# Patient Record
Sex: FEMALE | Race: WHITE | NOT HISPANIC OR LATINO | URBAN - METROPOLITAN AREA
[De-identification: names, ages, dates, MRNs, and addresses within clinical notes are randomized per-mention and may not be internally consistent; named-entity substitution may affect disease eponyms.]

---

## 2017-10-06 ENCOUNTER — INPATIENT (INPATIENT)
Facility: HOSPITAL | Age: 67
LOS: 0 days | Discharge: HOME | DRG: 313 | End: 2017-10-06
Attending: INTERNAL MEDICINE | Admitting: INTERNAL MEDICINE
Payer: COMMERCIAL

## 2017-10-06 ENCOUNTER — TRANSCRIPTION ENCOUNTER (OUTPATIENT)
Age: 67
End: 2017-10-06

## 2017-10-06 VITALS
OXYGEN SATURATION: 99 % | RESPIRATION RATE: 16 BRPM | DIASTOLIC BLOOD PRESSURE: 86 MMHG | HEART RATE: 83 BPM | SYSTOLIC BLOOD PRESSURE: 168 MMHG

## 2017-10-06 VITALS — WEIGHT: 181.44 LBS

## 2017-10-06 DIAGNOSIS — R79.89 OTHER SPECIFIED ABNORMAL FINDINGS OF BLOOD CHEMISTRY: ICD-10-CM

## 2017-10-06 DIAGNOSIS — M79.605 PAIN IN LEFT LEG: ICD-10-CM

## 2017-10-06 DIAGNOSIS — E78.4 OTHER HYPERLIPIDEMIA: ICD-10-CM

## 2017-10-06 DIAGNOSIS — R07.9 CHEST PAIN, UNSPECIFIED: ICD-10-CM

## 2017-10-06 DIAGNOSIS — F32.9 MAJOR DEPRESSIVE DISORDER, SINGLE EPISODE, UNSPECIFIED: ICD-10-CM

## 2017-10-06 DIAGNOSIS — I10 ESSENTIAL (PRIMARY) HYPERTENSION: ICD-10-CM

## 2017-10-06 DIAGNOSIS — Z29.9 ENCOUNTER FOR PROPHYLACTIC MEASURES, UNSPECIFIED: ICD-10-CM

## 2017-10-06 LAB
ALBUMIN SERPL ELPH-MCNC: 4.2 G/DL — SIGNIFICANT CHANGE UP (ref 3.3–5)
ALP SERPL-CCNC: 63 U/L — SIGNIFICANT CHANGE UP (ref 40–120)
ALT FLD-CCNC: 20 U/L RC — SIGNIFICANT CHANGE UP (ref 10–45)
ANION GAP SERPL CALC-SCNC: 15 MMOL/L — SIGNIFICANT CHANGE UP (ref 5–17)
AST SERPL-CCNC: 21 U/L — SIGNIFICANT CHANGE UP (ref 10–40)
BASOPHILS # BLD AUTO: 0.1 K/UL — SIGNIFICANT CHANGE UP (ref 0–0.2)
BASOPHILS NFR BLD AUTO: 0.8 % — SIGNIFICANT CHANGE UP (ref 0–2)
BILIRUB SERPL-MCNC: 0.2 MG/DL — SIGNIFICANT CHANGE UP (ref 0.2–1.2)
BUN SERPL-MCNC: 35 MG/DL — HIGH (ref 7–23)
CALCIUM SERPL-MCNC: 9.9 MG/DL — SIGNIFICANT CHANGE UP (ref 8.4–10.5)
CHLORIDE SERPL-SCNC: 103 MMOL/L — SIGNIFICANT CHANGE UP (ref 96–108)
CK MB BLD-MCNC: 1.1 % — SIGNIFICANT CHANGE UP (ref 0–3.5)
CK MB CFR SERPL CALC: 1.2 NG/ML — SIGNIFICANT CHANGE UP (ref 0–3.8)
CK MB CFR SERPL CALC: 1.2 NG/ML — SIGNIFICANT CHANGE UP (ref 0–3.8)
CK SERPL-CCNC: 114 U/L — SIGNIFICANT CHANGE UP (ref 25–170)
CK SERPL-CCNC: 82 U/L — SIGNIFICANT CHANGE UP (ref 25–170)
CO2 SERPL-SCNC: 24 MMOL/L — SIGNIFICANT CHANGE UP (ref 22–31)
CREAT SERPL-MCNC: 1.19 MG/DL — SIGNIFICANT CHANGE UP (ref 0.5–1.3)
EOSINOPHIL # BLD AUTO: 0.2 K/UL — SIGNIFICANT CHANGE UP (ref 0–0.5)
EOSINOPHIL NFR BLD AUTO: 2.1 % — SIGNIFICANT CHANGE UP (ref 0–6)
GLUCOSE SERPL-MCNC: 124 MG/DL — HIGH (ref 70–99)
HCT VFR BLD CALC: 36.4 % — SIGNIFICANT CHANGE UP (ref 34.5–45)
HGB BLD-MCNC: 11.8 G/DL — SIGNIFICANT CHANGE UP (ref 11.5–15.5)
LYMPHOCYTES # BLD AUTO: 2.1 K/UL — SIGNIFICANT CHANGE UP (ref 1–3.3)
LYMPHOCYTES # BLD AUTO: 22.5 % — SIGNIFICANT CHANGE UP (ref 13–44)
MAGNESIUM SERPL-MCNC: 1.9 MG/DL — SIGNIFICANT CHANGE UP (ref 1.6–2.6)
MCHC RBC-ENTMCNC: 28.5 PG — SIGNIFICANT CHANGE UP (ref 27–34)
MCHC RBC-ENTMCNC: 32.4 GM/DL — SIGNIFICANT CHANGE UP (ref 32–36)
MCV RBC AUTO: 87.9 FL — SIGNIFICANT CHANGE UP (ref 80–100)
MONOCYTES # BLD AUTO: 0.6 K/UL — SIGNIFICANT CHANGE UP (ref 0–0.9)
MONOCYTES NFR BLD AUTO: 6.8 % — SIGNIFICANT CHANGE UP (ref 2–14)
NEUTROPHILS # BLD AUTO: 6.4 K/UL — SIGNIFICANT CHANGE UP (ref 1.8–7.4)
NEUTROPHILS NFR BLD AUTO: 67.8 % — SIGNIFICANT CHANGE UP (ref 43–77)
PHOSPHATE SERPL-MCNC: 3.1 MG/DL — SIGNIFICANT CHANGE UP (ref 2.5–4.5)
PLATELET # BLD AUTO: 327 K/UL — SIGNIFICANT CHANGE UP (ref 150–400)
POTASSIUM SERPL-MCNC: 4.7 MMOL/L — SIGNIFICANT CHANGE UP (ref 3.5–5.3)
POTASSIUM SERPL-SCNC: 4.7 MMOL/L — SIGNIFICANT CHANGE UP (ref 3.5–5.3)
PROT SERPL-MCNC: 8.3 G/DL — SIGNIFICANT CHANGE UP (ref 6–8.3)
RBC # BLD: 4.14 M/UL — SIGNIFICANT CHANGE UP (ref 3.8–5.2)
RBC # FLD: 14 % — SIGNIFICANT CHANGE UP (ref 10.3–14.5)
SODIUM SERPL-SCNC: 142 MMOL/L — SIGNIFICANT CHANGE UP (ref 135–145)
TROPONIN T SERPL-MCNC: <0.01 NG/ML — SIGNIFICANT CHANGE UP (ref 0–0.06)
TROPONIN T SERPL-MCNC: <0.01 NG/ML — SIGNIFICANT CHANGE UP (ref 0–0.06)
WBC # BLD: 9.4 K/UL — SIGNIFICANT CHANGE UP (ref 3.8–10.5)
WBC # FLD AUTO: 9.4 K/UL — SIGNIFICANT CHANGE UP (ref 3.8–10.5)

## 2017-10-06 PROCEDURE — 93018 CV STRESS TEST I&R ONLY: CPT

## 2017-10-06 PROCEDURE — 99285 EMERGENCY DEPT VISIT HI MDM: CPT | Mod: 25,GC

## 2017-10-06 PROCEDURE — 99223 1ST HOSP IP/OBS HIGH 75: CPT

## 2017-10-06 PROCEDURE — 71020: CPT | Mod: 26

## 2017-10-06 PROCEDURE — 93016 CV STRESS TEST SUPVJ ONLY: CPT

## 2017-10-06 PROCEDURE — 93010 ELECTROCARDIOGRAM REPORT: CPT

## 2017-10-06 PROCEDURE — 78452 HT MUSCLE IMAGE SPECT MULT: CPT | Mod: 26

## 2017-10-06 RX ORDER — CHOLECALCIFEROL (VITAMIN D3) 125 MCG
1 CAPSULE ORAL
Qty: 0 | Refills: 0 | COMMUNITY

## 2017-10-06 RX ORDER — ASPIRIN/CALCIUM CARB/MAGNESIUM 324 MG
1 TABLET ORAL
Qty: 0 | Refills: 0 | COMMUNITY
Start: 2017-10-06

## 2017-10-06 RX ORDER — ASPIRIN/CALCIUM CARB/MAGNESIUM 324 MG
81 TABLET ORAL DAILY
Qty: 0 | Refills: 0 | Status: DISCONTINUED | OUTPATIENT
Start: 2017-10-06 | End: 2017-10-06

## 2017-10-06 RX ORDER — HYDROCHLOROTHIAZIDE 25 MG
12.5 TABLET ORAL DAILY
Qty: 0 | Refills: 0 | Status: DISCONTINUED | OUTPATIENT
Start: 2017-10-06 | End: 2017-10-06

## 2017-10-06 RX ORDER — ATORVASTATIN CALCIUM 80 MG/1
1 TABLET, FILM COATED ORAL
Qty: 0 | Refills: 0 | COMMUNITY

## 2017-10-06 RX ORDER — ACETAMINOPHEN 500 MG
650 TABLET ORAL EVERY 6 HOURS
Qty: 0 | Refills: 0 | Status: DISCONTINUED | OUTPATIENT
Start: 2017-10-06 | End: 2017-10-06

## 2017-10-06 RX ORDER — HYDROCHLOROTHIAZIDE 25 MG
12.5 TABLET ORAL ONCE
Qty: 0 | Refills: 0 | Status: COMPLETED | OUTPATIENT
Start: 2017-10-06 | End: 2017-10-06

## 2017-10-06 RX ORDER — ASPIRIN/CALCIUM CARB/MAGNESIUM 324 MG
1 TABLET ORAL
Qty: 0 | Refills: 0 | COMMUNITY

## 2017-10-06 RX ORDER — SODIUM CHLORIDE 9 MG/ML
500 INJECTION INTRAMUSCULAR; INTRAVENOUS; SUBCUTANEOUS ONCE
Qty: 0 | Refills: 0 | Status: COMPLETED | OUTPATIENT
Start: 2017-10-06 | End: 2017-10-06

## 2017-10-06 RX ORDER — ONDANSETRON 8 MG/1
4 TABLET, FILM COATED ORAL ONCE
Qty: 0 | Refills: 0 | Status: COMPLETED | OUTPATIENT
Start: 2017-10-06 | End: 2017-10-06

## 2017-10-06 RX ORDER — CITALOPRAM 10 MG/1
10 TABLET, FILM COATED ORAL DAILY
Qty: 0 | Refills: 0 | Status: DISCONTINUED | OUTPATIENT
Start: 2017-10-06 | End: 2017-10-06

## 2017-10-06 RX ORDER — CITALOPRAM 10 MG/1
20 TABLET, FILM COATED ORAL DAILY
Qty: 0 | Refills: 0 | Status: DISCONTINUED | OUTPATIENT
Start: 2017-10-06 | End: 2017-10-06

## 2017-10-06 RX ORDER — ROSUVASTATIN CALCIUM 5 MG/1
0 TABLET ORAL
Qty: 0 | Refills: 0 | COMMUNITY

## 2017-10-06 RX ORDER — ATORVASTATIN CALCIUM 80 MG/1
1 TABLET, FILM COATED ORAL
Qty: 0 | Refills: 0 | COMMUNITY
Start: 2017-10-06

## 2017-10-06 RX ORDER — METOCLOPRAMIDE HCL 10 MG
10 TABLET ORAL ONCE
Qty: 0 | Refills: 0 | Status: COMPLETED | OUTPATIENT
Start: 2017-10-06 | End: 2017-10-06

## 2017-10-06 RX ORDER — ATORVASTATIN CALCIUM 80 MG/1
20 TABLET, FILM COATED ORAL AT BEDTIME
Qty: 0 | Refills: 0 | Status: DISCONTINUED | OUTPATIENT
Start: 2017-10-06 | End: 2017-10-06

## 2017-10-06 RX ORDER — VALSARTAN 80 MG/1
320 TABLET ORAL ONCE
Qty: 0 | Refills: 0 | Status: COMPLETED | OUTPATIENT
Start: 2017-10-06 | End: 2017-10-06

## 2017-10-06 RX ORDER — CITALOPRAM 10 MG/1
1 TABLET, FILM COATED ORAL
Qty: 0 | Refills: 0 | COMMUNITY
Start: 2017-10-06

## 2017-10-06 RX ORDER — CITALOPRAM 10 MG/1
1 TABLET, FILM COATED ORAL
Qty: 0 | Refills: 0 | COMMUNITY

## 2017-10-06 RX ORDER — HYDROCHLOROTHIAZIDE 25 MG
12.5 TABLET ORAL ONCE
Qty: 0 | Refills: 0 | Status: DISCONTINUED | OUTPATIENT
Start: 2017-10-06 | End: 2017-10-06

## 2017-10-06 RX ORDER — ONDANSETRON 8 MG/1
4 TABLET, FILM COATED ORAL EVERY 6 HOURS
Qty: 0 | Refills: 0 | Status: DISCONTINUED | OUTPATIENT
Start: 2017-10-06 | End: 2017-10-06

## 2017-10-06 RX ORDER — MILK THISTLE 150 MG
1 CAPSULE ORAL
Qty: 0 | Refills: 0 | COMMUNITY

## 2017-10-06 RX ORDER — ENOXAPARIN SODIUM 100 MG/ML
40 INJECTION SUBCUTANEOUS EVERY 24 HOURS
Qty: 0 | Refills: 0 | Status: DISCONTINUED | OUTPATIENT
Start: 2017-10-06 | End: 2017-10-06

## 2017-10-06 RX ORDER — VALSARTAN 80 MG/1
320 TABLET ORAL DAILY
Qty: 0 | Refills: 0 | Status: DISCONTINUED | OUTPATIENT
Start: 2017-10-06 | End: 2017-10-06

## 2017-10-06 RX ADMIN — Medication 12.5 MILLIGRAM(S): at 17:26

## 2017-10-06 RX ADMIN — ONDANSETRON 4 MILLIGRAM(S): 8 TABLET, FILM COATED ORAL at 04:08

## 2017-10-06 RX ADMIN — ENOXAPARIN SODIUM 40 MILLIGRAM(S): 100 INJECTION SUBCUTANEOUS at 12:46

## 2017-10-06 RX ADMIN — Medication 81 MILLIGRAM(S): at 12:47

## 2017-10-06 RX ADMIN — VALSARTAN 320 MILLIGRAM(S): 80 TABLET ORAL at 06:30

## 2017-10-06 RX ADMIN — Medication 10 MILLIGRAM(S): at 06:01

## 2017-10-06 RX ADMIN — CITALOPRAM 20 MILLIGRAM(S): 10 TABLET, FILM COATED ORAL at 12:47

## 2017-10-06 RX ADMIN — SODIUM CHLORIDE 1000 MILLILITER(S): 9 INJECTION INTRAMUSCULAR; INTRAVENOUS; SUBCUTANEOUS at 06:01

## 2017-10-06 RX ADMIN — Medication 12.5 MILLIGRAM(S): at 06:30

## 2017-10-06 RX ADMIN — SODIUM CHLORIDE 500 MILLILITER(S): 9 INJECTION INTRAMUSCULAR; INTRAVENOUS; SUBCUTANEOUS at 03:28

## 2017-10-06 NOTE — ED ADULT NURSE NOTE - OBJECTIVE STATEMENT
68y/o female BIBA a&ox3 c/o chest discomfort. Patient reports she was having some knee pain tonight around 1230 and she got up to take some medicine. States when she got back into bed she had sudden onset chest tightness and nausea. Also c/o dizziness and generalized weakness. As per EMS, patient vomited x2 prior to arrival. EMS reports administering 162mg ASA and 8mg Zofran prior to arrival. Patient state she already took 81mg ASA at home. Patient currently c/o mild dizziness and some chest discomfort, states her symptoms have improved since she got to ED. Denies current N/V, HA, vision changes, SOB, abd pain, fever, back pain. Lungs clear b/l. Abd soft, nontender, nondistended. Hypertensive in ED. EKG done. Patient resting in bed on CM awaiting MD rivero. Safety and comfort maintained.

## 2017-10-06 NOTE — H&P ADULT - HISTORY OF PRESENT ILLNESS
67F h/o HTN, HLD, anxiety, p/w chest pain. Chest pain started last night, was substernal, non-radiating, constant, described as heaviness, somewhat exertional, associated with some diaphoresis, no alleviating factors. Pt had similar episodes in the past, was told it was stress related. Last stress test a few years ago was negative as per patient. Pt also reports nausea and non-bloody vomiting last night, as well as a few episodes of diarrhea. 67F h/o HTN, HLD, depression, p/w chest pain. Chest pain started last night, was substernal, non-radiating, constant, described as heaviness, somewhat exertional, associated with some diaphoresis, no alleviating factors. Pt had similar episodes in the past, was told it was stress related. Last stress test a few years ago was negative as per patient. Pt also reports nausea and non-bloody vomiting last night, as well as a few episodes of diarrhea.

## 2017-10-06 NOTE — ED PROVIDER NOTE - ATTENDING CONTRIBUTION TO CARE
Attending MD Leonardo.  Agree with above.  Pt is a 67 yr old female with hx of HLD, HTN presenting to ED with complaint tightness in her chest, nausea and dry heaving non-bloody emesis.  Pain is non-radiating, substernal.  No fevers/chills.  Pain is 'somewhat positional'.  Pt received 2 rounds 4 mg zofran by EMS and baby ASA.  Cards is Dr. Mark Arevalo.  Strong family hx of cardiac disease.  Plan to obtain labs/trops/admit for provocative cardiac testing with stress test.  Pt is a non-smoker.

## 2017-10-06 NOTE — ED ADULT NURSE REASSESSMENT NOTE - NS ED NURSE REASSESS COMMENT FT1
patient c/o sudden increase in nausea. States she "spit up" a few times. MD Leonardo aware. Medicated as per orders.

## 2017-10-06 NOTE — H&P ADULT - PROBLEM SELECTOR PLAN 5
-intermittent, associated with LE edema   -check LE doppler to r/o DVT -patient on a statin at home, not sure of name, thinks it might be Crestor. Need to clarify.   -check FLP

## 2017-10-06 NOTE — ED ADULT NURSE REASSESSMENT NOTE - NS ED NURSE REASSESS COMMENT FT1
Per patient she takes valsartan 320mg and hctz 12.5mg daily for blood pressure.  Medications ordered as per patient's request because she takes BP meds in the morning.  ED MD Jenny alvarez.

## 2017-10-06 NOTE — DISCHARGE NOTE ADULT - CARE PROVIDER_API CALL
Bentley Cagle (MD), Cardiovascular Disease; Internal Medicine  [address] [address]  Phone: [phone]  Fax: [phone]

## 2017-10-06 NOTE — PROGRESS NOTE ADULT - ASSESSMENT
Pt with midsternal CP after episode of N/V, EKG AND enzymes neg. Doubt ischemia but given sx and family will need to r/o ischemia. Doubt sx represent PE and except for swelling in left knee which is most likely related to arthritis changes, no edema of LE therefore doubt DVT and will hold on venous dopplers. GI sx may have been related to NSAIDs, WILL follow for now and can give some IVF

## 2017-10-06 NOTE — H&P ADULT - PROBLEM SELECTOR PLAN 2
-elevated BP  -continue Valsartan/HCTZ   -monitor BP -intermittent, associated with LE edema   -check LE doppler to r/o DVT

## 2017-10-06 NOTE — DISCHARGE NOTE ADULT - PATIENT PORTAL LINK FT
“You can access the [facility] Patient Portal, offered by [facility], by registering with the following website: [email or web address]”

## 2017-10-06 NOTE — PROGRESS NOTE ADULT - SUBJECTIVE AND OBJECTIVE BOX
Patient is a 67y old  Female who presents with a chief complaint of chest pain (06 Oct 2017 08:35)  67y    HPI:  67F h/o HTN, HLD, depression, p/w chest pain. Pt was stable until last night when she developed pain in her left knee. Pt took ibuprofen and than topical pain reliever and than developed nausea/vomiting and diarrhea. Subsequent to that pt developed midsternal  Chest pain which was  non-radiating, constant, described as heaviness,  associated with some diaphoresis and sob. Symptoms lasted 2 hrs and no alleviating factors and pt came to ER for evaluation. Pt had similar episodes in the past, was told it was stress related. Last stress test several  years ago was negative as per patient. Pt also reports nausea and non-bloody vomiting last night, as well as a few episodes of diarrhea. (06 Oct 2017 08:35)            MEDICATIONS  (STANDING):  aspirin enteric coated 81 milliGRAM(s) Oral daily  citalopram 10 milliGRAM(s) Oral daily  enoxaparin Injectable 40 milliGRAM(s) SubCutaneous every 24 hours  hydrochlorothiazide 12.5 milliGRAM(s) Oral daily  valsartan 320 milliGRAM(s) Oral daily  atorvastatin 20mg daily    MEDICATIONS  (PRN):  acetaminophen   Tablet. 650 milliGRAM(s) Oral every 6 hours PRN Mild Pain (1 - 3)  ondansetron Injectable 4 milliGRAM(s) IV Push every 6 hours PRN Nausea      Allergies    No Known Allergies    FAMILY HISTORY:  Family history of early CAD (Father)        Social HX   occassional ETOH      REVIEW OF SYSTEMS:  CONSTITUTIONAL: No fever, weight loss, or fatigue  EYES: No eye pain, visual disturbances, or discharge  ENMT:  No difficulty hearing, tinnitus, vertigo; No sinus or throat pain  NECK: No pain or stiffness  BREASTS: No pain, masses, or nipple discharge  RESPIRATORY: No cough, wheezing, chills or hemoptysis; No shortness of breath(now)  CARDIOVASCULAR: No chest pain (NOW), palpitations, dizziness, or leg swelling  GASTROINTESTINAL: No abdominal or epigastric pain. No nausea or  vomiting (now),  No diarrhea (now) or constipation. No melena or hematochezia.  GENITOURINARY: No dysuria, frequency, hematuria, or incontinence  NEUROLOGICAL: No headaches, memory loss, loss of strength, numbness, or tremors  SKIN: No itching, burning, rashes, or lesions   LYMPH NODES: No enlarged glands  ENDOCRINE: No heat or cold intolerance; No hair loss  MUSCULOSKELETAL: LEFT knee  pain and mild  swelling; No muscle, back, or extremity pain  PSYCHIATRIC: No depression, + anxiety  HEME/LYMPH: No easy bruising, or bleeding gums  ALLERY AND IMMUNOLOGIC: No hives or eczema        Vital Signs Last 24 Hrs  T(C): 36.5 (06 Oct 2017 08:01), Max: 36.7 (06 Oct 2017 02:59)  T(F): 97.7 (06 Oct 2017 08:01), Max: 98.1 (06 Oct 2017 02:59)  HR: 83 (06 Oct 2017 08:01) (83 - 88)  BP: 172/73 (06 Oct 2017 08:01) (161/70 - 184/70)  BP(mean): --  RR: 18 (06 Oct 2017 08:01) (16 - 18)  SpO2: 98% (06 Oct 2017 08:01) (97% - 100%)      PHYSICAL EXAM:  GENERAL: NAD, well-groomed, well-developed  HEAD:  Atraumatic, Normocephalic  EYES: conjunctiva and sclera clear  ENMT: No tonsillar erythema, exudates, or enlargement; Moist mucous membranes, Good dentition, No lesions  NECK: Supple, No JVD, Normal thyroid  NERVOUS SYSTEM:  Alert & Oriented X3, Good concentration; Motor Strength 5/5 B/L upper and lower extremities  CHEST/LUNG: Clear to  auscultation bilaterally; No rales, rhonchi, wheezing, or rubs  HEART: Regular rate and rhythm; No murmurs, rubs, or gallops  ABDOMEN: Soft, Nontender, Nondistended; Bowel sounds present, neg HSM  EXTREMITIES: No clubbing, cyanosis, or edema  LYMPH: No lymphadenopathy noted  SKIN: No rashes or lesions  MS:mild swelling left knee, warm, no erythema        CBC Full  -  ( 06 Oct 2017 03:28 )  WBC Count : 9.4 K/uL  Hemoglobin : 11.8 g/dL  Hematocrit : 36.4 %  Platelet Count - Automated : 327 K/uL  Mean Cell Volume : 87.9 fl  Mean Cell Hemoglobin : 28.5 pg  Mean Cell Hemoglobin Concentration : 32.4 gm/dL  Auto Neutrophil # : 6.4 K/uL  Auto Lymphocyte # : 2.1 K/uL  Auto Monocyte # : 0.6 K/uL  Auto Eosinophil # : 0.2 K/uL  Auto Basophil # : 0.1 K/uL  Auto Neutrophil % : 67.8 %  Auto Lymphocyte % : 22.5 %  Auto Monocyte % : 6.8 %  Auto Eosinophil % : 2.1 %  Auto Basophil % : 0.8 %    10-06    142  |  103  |  35<H>  ----------------------------<  124<H>  4.7   |  24  |  1.19    Ca    9.9      06 Oct 2017 03:28  Phos  3.1     10-06  Mg     1.9     10-06    TPro  8.3  /  Alb  4.2  /  TBili  0.2  /  DBili  x   /  AST  21  /  ALT  20  /  AlkPhos  63  10-06

## 2017-10-06 NOTE — ED PROVIDER NOTE - OBJECTIVE STATEMENT
68 yo F w/ pmhx of HTN, HLD c/o tightness in the chest, nausea, vomit NB but bilious which happened around 12:00am. Chest pain is substernal, non-radiating, constant, somewhat exertional, some diaphoresis. Denies any numbness and tingling, fever, productive cough, dysuria, diarrhea, constipation. Denies any recent travel. Pt has some chronic leg swelling, but felt a bit tight tonight, maybe more swollen than usual. Pt is not smoker. (+) phx of MI. Pt's cardiologist is Dr. Mark Arevalo. Pt was brought in by ambulance, received zofran and 2 baby ASA. 68 yo F w/ pmhx of HTN, HLD c/o tightness in the chest, nausea, vomit NB but bilious which happened around 12:00am. Chest pain is substernal, non-radiating, constant, somewhat exertional, some diaphoresis. Denies any numbness and tingling, fever, productive cough, dysuria, diarrhea, constipation. Denies any recent travel. Pt has some chronic leg swelling, but felt a bit tight tonight, maybe more swollen than usual. Pt is not smoker. (+) phx of MI. Pt's cardiologist is Dr. Mark Arevalo. Pt was brought in by ambulance, received Zofran and 2 baby ASA.

## 2017-10-06 NOTE — DISCHARGE NOTE ADULT - MEDICATION SUMMARY - MEDICATIONS TO TAKE
I will START or STAY ON the medications listed below when I get home from the hospital:    Turmeric  -- 1 cap(s) by mouth once a day  -- Indication: For herbal     aspirin 81 mg oral delayed release tablet  -- 1 tab(s) by mouth once a day  -- Indication: For CAD    citalopram 20 mg oral tablet  -- 1 tab(s) by mouth once a day  -- Indication: For Anti-depressant    atorvastatin 20 mg oral tablet  -- 1 tab(s) by mouth once a day (at bedtime)  -- Indication: For High Cholesterol    valsartan-hydrochlorothiazide 320mg-12.5mg oral tablet  -- 1 tab(s) by mouth once a day  -- Indication: For High Blood Pressure    multivitamin  -- 1 tab(s) by mouth once a day  -- Indication: For Vitamin    Vitamin D3  -- 1 cap(s) by mouth once a day  -- Indication: For Vitamin

## 2017-10-06 NOTE — H&P ADULT - PROBLEM SELECTOR PLAN 1
-EKG normal  -cardiac enzymes negative x 1  -admit to telemetry  -trend cardiac enzymes  -stress test if enzymes negative  -will notify Cardiologist, Dr Arevalo -EKG normal  -cardiac enzymes negative x 1  -admit to telemetry  -trend cardiac enzymes  -spoke with Cardiologist, Dr Arevalo, will obtain stress test if enzymes negative

## 2017-10-06 NOTE — H&P ADULT - NSHPLABSRESULTS_GEN_ALL_CORE
11.8   9.4   )-----------( 327      ( 06 Oct 2017 03:28 )             36.4   10-06    142  |  103  |  35<H>  ----------------------------<  124<H>  4.7   |  24  |  1.19    Ca    9.9      06 Oct 2017 03:28  Phos  3.1     10-06  Mg     1.9     10-06    TPro  8.3  /  Alb  4.2  /  TBili  0.2  /  DBili  x   /  AST  21  /  ALT  20  /  AlkPhos  63  10-06    CARDIAC MARKERS ( 06 Oct 2017 03:28 )  x     / <0.01 ng/mL / 114 U/L / x     / 1.2 ng/mL    CXR (prelim): clear lungs    EKG tracing reviewed and interpreted: NSR

## 2017-10-06 NOTE — DISCHARGE NOTE ADULT - PLAN OF CARE
Patient remains hemodynamically stable. HOME CARE INSTRUCTIONS  For the next few days, avoid physical activities that bring on chest pain. Continue physical activities as directed.  Do not smoke.  Avoid drinking alcohol.   Only take over-the-counter or prescription medicine for pain, discomfort, or fever as directed by your caregiver.  Follow your caregiver's suggestions for further testing if your chest pain does not go away.  Keep any follow-up appointments you made. If you do not go to an appointment, you could develop lasting (chronic) problems with pain. If there is any problem keeping an appointment, you must call to reschedule.   SEEK MEDICAL CARE IF:  You think you are having problems from the medicine you are taking. Read your medicine instructions carefully.  Your chest pain does not go away, even after treatment.  You develop a rash with blisters on your chest.  SEEK IMMEDIATE MEDICAL CARE IF:  You have increased chest pain or pain that spreads to your arm, neck, jaw, back, or abdomen.   You develop shortness of breath, an increasing cough, or you are coughing up blood.  You have severe back or abdominal pain, feel nauseous, or vomit.  You develop severe weakness, fainting, or chills.  You have a fever.  THIS IS AN EMERGENCY. Do not wait to see if the pain will go away. Get medical help at once. Call your local emergency services . Do not drive yourself to the hospital. Low salt diet  Activity as tolerated.  Take all medication as prescribed.  Follow up with your medical doctor for routine blood pressure monitoring at your next visit.  Notify your doctor if you have any of the following symptoms:   Dizziness, Lightheadedness, Blurry vision, Headache, Chest pain, Shortness of breath Continue with current medication.

## 2017-10-06 NOTE — H&P ADULT - PROBLEM SELECTOR PLAN 3
-patient on a statin at home, not sure of name, thinks it might be Crestor. Need to clarify.   -check FLP -due to nausea/vomiting and diarrhea, now resolved   -Zofran prn   -s/p IVF   -may continue ARB/HCTZ  -monitor BUN/Cr

## 2017-10-06 NOTE — DISCHARGE NOTE ADULT - HOSPITAL COURSE
Pt with midsternal CP after episode of N/V, EKG AND enzymes neg. Doubt ischemia but given sx and family will need to r/o ischemia. Doubt sx represent PE and except for swelling in left knee which is most likely related to arthritis changes, no edema of LE therefore doubt DVT and will hold on venous dopplers. GI sx may have been related to NSAIDs, WILL follow for now and can give some IVF     Problem/Plan - 1:  ·  Problem: Chest pain, unspecified type.     cardiology eval     Stress test negative.      Problem/Plan - 2:  ·  Problem: Essential hypertension.    Continue current medications. Follow up with Dr. Cagle next week for BP eval    Pt stable discharged home with outpatient follow up with Dr. Cagle. Pt with midsternal CP after episode of N/V, EKG AND enzymes neg. Doubt ischemia but given sx and family will need to r/o ischemia. Doubt sx represent PE and except for swelling in left knee which is most likely related to arthritis changes, no edema of LE therefore doubt DVT and will hold on venous dopplers. GI sx may have been related to NSAIDs. Pt;s cardiac enzymes were neg and pt underwent ETT which was neg as well. Pt  was  stable and was discharged home with outpatient follow up in my office in 1 week.

## 2017-10-06 NOTE — CONSULT NOTE ADULT - SUBJECTIVE AND OBJECTIVE BOX
Chief Complaint:     HPI:    PMH:   Pneumonia  Depression  Osteopenia  Hypertension  Hyperlipidemia    PSH:   S/P knee replacement partial on left  S/P Arthroscopic Knee Surgery  S/P  Section    Family History:  FAMILY HISTORY:  Family history of early CAD (Father)    Allergies:  No Known Allergies    Social History:  Smoking:  Alcohol:  Drugs:    Medications:  acetaminophen   Tablet. 650 milliGRAM(s) Oral every 6 hours PRN  aspirin enteric coated 81 milliGRAM(s) Oral daily  atorvastatin 20 milliGRAM(s) Oral at bedtime  citalopram 10 milliGRAM(s) Oral daily  enoxaparin Injectable 40 milliGRAM(s) SubCutaneous every 24 hours  hydrochlorothiazide 12.5 milliGRAM(s) Oral daily  ondansetron Injectable 4 milliGRAM(s) IV Push every 6 hours PRN  valsartan 320 milliGRAM(s) Oral daily      Cardiovascular Diagnostic Testing:  ECG:    Echo:    Stress Testing:    Cath:    Imaging:    Labs:                        11.8   9.4   )-----------( 327      ( 06 Oct 2017 03:28 )             36.4     10-06    142  |  103  |  35<H>  ----------------------------<  124<H>  4.7   |  24  |  1.19    Ca    9.9      06 Oct 2017 03:28  Phos  3.1     10  Mg     1.9     10-06    TPro  8.3  /  Alb  4.2  /  TBili  0.2  /  DBili  x   /  AST  21  /  ALT  20  /  AlkPhos  63  10-      CARDIAC MARKERS ( 06 Oct 2017 03:28 )  x     / <0.01 ng/mL / 114 U/L / x     / 1.2 ng/mL                Physical Exam:  T(C): 36.5 (10-06-17 @ 08:01), Max: 36.7 (10-06-17 @ 02:59)  HR: 83 (10-06-17 @ 08:01) (83 - 88)  BP: 172/73 (10-06-17 @ 08:01) (161/70 - 184/70)  RR: 18 (10-06-17 @ 08:01) (16 - 18)  SpO2: 98% (10-06-17 @ 08:01) (97% - 100%)  Wt(kg): --    Daily     Daily Weight in k.3 (06 Oct 2017 07:54) Chief Complaint: Chest pain    HPI: 67 F presents with chest pain. She noted not feeling well last evening including left leg pain which she gets related to prior knee surgery. Should took NSAID. Shortly after she felt chest pain. Associated with nausea. Also with cold sweat and SOB. No radiation. Resolved on its own. Pain is described as a tightness of moderate severity. She has HTN on medical therapy. HLD on statin therapy. She notes a strong family history of CAD with both parents having heart attacks and a sister who has had CABG.      PMH:   Pneumonia  Depression  Osteopenia  Hypertension  Hyperlipidemia    PSH:   S/P knee replacement partial on left  S/P Arthroscopic Knee Surgery  S/P  Section    Family History:  FAMILY HISTORY:  Family history of early CAD (Father)    Allergies:  No Known Allergies    Social History:  Smoking:  Alcohol:  Drugs:    Medications:  acetaminophen   Tablet. 650 milliGRAM(s) Oral every 6 hours PRN  aspirin enteric coated 81 milliGRAM(s) Oral daily  atorvastatin 20 milliGRAM(s) Oral at bedtime  citalopram 10 milliGRAM(s) Oral daily  enoxaparin Injectable 40 milliGRAM(s) SubCutaneous every 24 hours  hydrochlorothiazide 12.5 milliGRAM(s) Oral daily  ondansetron Injectable 4 milliGRAM(s) IV Push every 6 hours PRN  valsartan 320 milliGRAM(s) Oral daily      Cardiovascular Diagnostic Testing:  ECG: NSR. Nl axis, nl intervals. No acute St-T changes.     Echo:    Stress Testing:    Cath:    Imaging:    Labs:                        11.8   9.4   )-----------( 327      ( 06 Oct 2017 03:28 )             36.4     10-    142  |  103  |  35<H>  ----------------------------<  124<H>  4.7   |  24  |  1.19    Ca    9.9      06 Oct 2017 03:28  Phos  3.1     10-  Mg     1.9     10-    TPro  8.3  /  Alb  4.2  /  TBili  0.2  /  DBili  x   /  AST  21  /  ALT  20  /  AlkPhos  63  10-      CARDIAC MARKERS ( 06 Oct 2017 03:28 )  x     / <0.01 ng/mL / 114 U/L / x     / 1.2 ng/mL    Physical Exam:  T(C): 36.5 (10-06-17 @ 08:01), Max: 36.7 (10-06-17 @ 02:59)  HR: 83 (10-06-17 @ 08:01) (83 - 88)  BP: 172/73 (10-06-17 @ 08:01) (161/70 - 184/70)  RR: 18 (10-06-17 @ 08:01) (16 - 18)  SpO2: 98% (10-06-17 @ 08:01) (97% - 100%)  Wt(kg): --    Daily     Daily Weight in k.3 (06 Oct 2017 07:54)

## 2017-10-06 NOTE — CONSULT NOTE ADULT - ASSESSMENT
67 F with chest pain, HTN, HLD and strong family history  ·	Given symptoms and multiple risk factors patient will need stress test.   ·	BP is elevated even after BP medications. Increase HCTZ to 25 mg qd. Continue valsartan. Patient may require additional agent.

## 2017-10-06 NOTE — H&P ADULT - NSHPPHYSICALEXAM_GEN_ALL_CORE
Vital Signs Last 24 Hrs  T(C): 36.5 (06 Oct 2017 08:01), Max: 36.7 (06 Oct 2017 02:59)  T(F): 97.7 (06 Oct 2017 08:01), Max: 98.1 (06 Oct 2017 02:59)  HR: 83 (06 Oct 2017 08:01) (83 - 88)  BP: 172/73 (06 Oct 2017 08:01) (161/70 - 184/70)  BP(mean): --  RR: 18 (06 Oct 2017 08:01) (16 - 18)  SpO2: 98% (06 Oct 2017 08:01) (97% - 100%)

## 2017-12-19 PROCEDURE — 82553 CREATINE MB FRACTION: CPT

## 2017-12-19 PROCEDURE — 96374 THER/PROPH/DIAG INJ IV PUSH: CPT

## 2017-12-19 PROCEDURE — 78452 HT MUSCLE IMAGE SPECT MULT: CPT

## 2017-12-19 PROCEDURE — 80053 COMPREHEN METABOLIC PANEL: CPT

## 2017-12-19 PROCEDURE — 83735 ASSAY OF MAGNESIUM: CPT

## 2017-12-19 PROCEDURE — 93005 ELECTROCARDIOGRAM TRACING: CPT

## 2017-12-19 PROCEDURE — 84100 ASSAY OF PHOSPHORUS: CPT

## 2017-12-19 PROCEDURE — A9500: CPT

## 2017-12-19 PROCEDURE — 85027 COMPLETE CBC AUTOMATED: CPT

## 2017-12-19 PROCEDURE — 99285 EMERGENCY DEPT VISIT HI MDM: CPT | Mod: 25

## 2017-12-19 PROCEDURE — 71046 X-RAY EXAM CHEST 2 VIEWS: CPT

## 2017-12-19 PROCEDURE — 82550 ASSAY OF CK (CPK): CPT

## 2017-12-19 PROCEDURE — 84484 ASSAY OF TROPONIN QUANT: CPT

## 2017-12-19 PROCEDURE — 93017 CV STRESS TEST TRACING ONLY: CPT

## 2019-09-16 ENCOUNTER — EMERGENCY (EMERGENCY)
Facility: HOSPITAL | Age: 69
LOS: 1 days | Discharge: HOME | End: 2019-09-16
Attending: EMERGENCY MEDICINE
Payer: MEDICARE

## 2019-09-16 VITALS
OXYGEN SATURATION: 100 % | HEART RATE: 82 BPM | RESPIRATION RATE: 16 BRPM | DIASTOLIC BLOOD PRESSURE: 84 MMHG | SYSTOLIC BLOOD PRESSURE: 187 MMHG

## 2019-09-16 VITALS
WEIGHT: 175.05 LBS | OXYGEN SATURATION: 99 % | DIASTOLIC BLOOD PRESSURE: 79 MMHG | RESPIRATION RATE: 18 BRPM | HEART RATE: 92 BPM | SYSTOLIC BLOOD PRESSURE: 183 MMHG | TEMPERATURE: 98 F | HEIGHT: 61 IN

## 2019-09-16 LAB
ALBUMIN SERPL ELPH-MCNC: 4.6 G/DL — SIGNIFICANT CHANGE UP (ref 3.3–5)
ALP SERPL-CCNC: 61 U/L — SIGNIFICANT CHANGE UP (ref 40–120)
ALT FLD-CCNC: 17 U/L — SIGNIFICANT CHANGE UP (ref 10–45)
ANION GAP SERPL CALC-SCNC: 14 MMOL/L — SIGNIFICANT CHANGE UP (ref 5–17)
AST SERPL-CCNC: 13 U/L — SIGNIFICANT CHANGE UP (ref 10–40)
BASOPHILS # BLD AUTO: 0 K/UL — SIGNIFICANT CHANGE UP (ref 0–0.2)
BASOPHILS NFR BLD AUTO: 0.2 % — SIGNIFICANT CHANGE UP (ref 0–2)
BILIRUB SERPL-MCNC: 0.4 MG/DL — SIGNIFICANT CHANGE UP (ref 0.2–1.2)
BUN SERPL-MCNC: 16 MG/DL — SIGNIFICANT CHANGE UP (ref 7–23)
CALCIUM SERPL-MCNC: 10.3 MG/DL — SIGNIFICANT CHANGE UP (ref 8.4–10.5)
CHLORIDE SERPL-SCNC: 102 MMOL/L — SIGNIFICANT CHANGE UP (ref 96–108)
CO2 SERPL-SCNC: 23 MMOL/L — SIGNIFICANT CHANGE UP (ref 22–31)
CREAT SERPL-MCNC: 0.73 MG/DL — SIGNIFICANT CHANGE UP (ref 0.5–1.3)
EOSINOPHIL # BLD AUTO: 0.1 K/UL — SIGNIFICANT CHANGE UP (ref 0–0.5)
EOSINOPHIL NFR BLD AUTO: 0.6 % — SIGNIFICANT CHANGE UP (ref 0–6)
GLUCOSE SERPL-MCNC: 119 MG/DL — HIGH (ref 70–99)
HCT VFR BLD CALC: 38.4 % — SIGNIFICANT CHANGE UP (ref 34.5–45)
HGB BLD-MCNC: 12.6 G/DL — SIGNIFICANT CHANGE UP (ref 11.5–15.5)
LYMPHOCYTES # BLD AUTO: 1.8 K/UL — SIGNIFICANT CHANGE UP (ref 1–3.3)
LYMPHOCYTES # BLD AUTO: 17.1 % — SIGNIFICANT CHANGE UP (ref 13–44)
MCHC RBC-ENTMCNC: 28.9 PG — SIGNIFICANT CHANGE UP (ref 27–34)
MCHC RBC-ENTMCNC: 32.8 GM/DL — SIGNIFICANT CHANGE UP (ref 32–36)
MCV RBC AUTO: 88.1 FL — SIGNIFICANT CHANGE UP (ref 80–100)
MONOCYTES # BLD AUTO: 0.6 K/UL — SIGNIFICANT CHANGE UP (ref 0–0.9)
MONOCYTES NFR BLD AUTO: 6.1 % — SIGNIFICANT CHANGE UP (ref 2–14)
NEUTROPHILS # BLD AUTO: 8.1 K/UL — HIGH (ref 1.8–7.4)
NEUTROPHILS NFR BLD AUTO: 76.1 % — SIGNIFICANT CHANGE UP (ref 43–77)
PLATELET # BLD AUTO: 308 K/UL — SIGNIFICANT CHANGE UP (ref 150–400)
POTASSIUM SERPL-MCNC: 4.2 MMOL/L — SIGNIFICANT CHANGE UP (ref 3.5–5.3)
POTASSIUM SERPL-SCNC: 4.2 MMOL/L — SIGNIFICANT CHANGE UP (ref 3.5–5.3)
PROT SERPL-MCNC: 8.4 G/DL — HIGH (ref 6–8.3)
RBC # BLD: 4.36 M/UL — SIGNIFICANT CHANGE UP (ref 3.8–5.2)
RBC # FLD: 13.2 % — SIGNIFICANT CHANGE UP (ref 10.3–14.5)
SODIUM SERPL-SCNC: 139 MMOL/L — SIGNIFICANT CHANGE UP (ref 135–145)
TROPONIN T, HIGH SENSITIVITY RESULT: 7 NG/L — SIGNIFICANT CHANGE UP (ref 0–51)
TROPONIN T, HIGH SENSITIVITY RESULT: <6 NG/L — SIGNIFICANT CHANGE UP (ref 0–51)
WBC # BLD: 10.6 K/UL — HIGH (ref 3.8–10.5)
WBC # FLD AUTO: 10.6 K/UL — HIGH (ref 3.8–10.5)

## 2019-09-16 PROCEDURE — 99284 EMERGENCY DEPT VISIT MOD MDM: CPT | Mod: GC

## 2019-09-16 PROCEDURE — 84484 ASSAY OF TROPONIN QUANT: CPT

## 2019-09-16 PROCEDURE — 80053 COMPREHEN METABOLIC PANEL: CPT

## 2019-09-16 PROCEDURE — 71046 X-RAY EXAM CHEST 2 VIEWS: CPT | Mod: 26

## 2019-09-16 PROCEDURE — 93005 ELECTROCARDIOGRAM TRACING: CPT

## 2019-09-16 PROCEDURE — 85027 COMPLETE CBC AUTOMATED: CPT

## 2019-09-16 PROCEDURE — 71046 X-RAY EXAM CHEST 2 VIEWS: CPT

## 2019-09-16 PROCEDURE — 99283 EMERGENCY DEPT VISIT LOW MDM: CPT | Mod: 25

## 2019-09-16 NOTE — ED ADULT NURSE NOTE - OBJECTIVE STATEMENT
69 y f came to the ed c/o chest tightness and cold sweats. says the symptoms started today around 730 while getting dressed. said she sat down and relaxed. said symptoms went away after an hour. does not think the resting was effective at getting rid of the symptoms as they lasted for about one hour after she sat down. denies any fever, chills, sob. skin is warm and dry.

## 2019-09-16 NOTE — ED PROVIDER NOTE - CLINICAL SUMMARY MEDICAL DECISION MAKING FREE TEXT BOX
69y female with nausea, chills, and chest tightness. Concern for acs, will get labs, ekg, chest xray.

## 2019-09-16 NOTE — ED PROVIDER NOTE - OBJECTIVE STATEMENT
69y female with PMH of HTN and anxiety presenting with nausea, chills and chest tightness. Started this morning has multiple episodes of chills and nausea, which were relieved after a soft BM. She then developed mild chest tightness which lasted a few minutes, no chest pain, no associated SOB. No diaphoresis, dizziness, abdominal pain, vomiting. Has a family history of heart disease. 69y female with PMH of HTN and anxiety presenting with nausea, chills and chest tightness. Started this morning has multiple episodes of chills and nausea, which were relieved after a soft BM. She then developed mild chest tightness which lasted a few minutes, no chest pain, no associated SOB. No diaphoresis, dizziness, abdominal pain, vomiting. Has a family history of heart disease.    Attendinyo female presents with a tightness in the chest this AM when she was having coffee.  symptoms began around 8am and was the worst around 815.  resolved around 10am.  no nausea or vomiting.  no diaphoresis.  felt a chill.  no shortness of breath.

## 2019-09-16 NOTE — ED ADULT NURSE NOTE - NSIMPLEMENTINTERV_GEN_ALL_ED
Implemented All Universal Safety Interventions:  [place] to call system. Call bell, personal items and telephone within reach. Instruct patient to call for assistance. Room bathroom lighting operational. Non-slip footwear when patient is off stretcher. Physically safe environment: no spills, clutter or unnecessary equipment. Stretcher in lowest position, wheels locked, appropriate side rails in place.

## 2019-09-16 NOTE — ED PROVIDER NOTE - NS ED ROS FT
Gen:+chills. No fever  Eyes: No vision changes, eye irritation or discharge  ENT: No congestion, sore throat  Resp: No cough or SOB  Cardiovascular: + chest tightness. No chest pain or palpitations  GI: +nausea. No abdominal pain, vomiting, diarrhea, constipation  :  No change in urine output or frequency; no dysuria  MS: No joint or muscle pain  Skin: No rashes  Neuro: No headache; No numbness or weakness  Remainder negative, except as per the HPI

## 2019-09-16 NOTE — ED PROVIDER NOTE - NSFOLLOWUPINSTRUCTIONS_ED_ALL_ED_FT
Please follow up with your cardiologist this week.    Return to the emergency department if you have new or worsening symptoms such as: chest pain, shortness of breath or nausea and vomiting.    Continue taking all of you medications as prescribed.

## 2019-09-16 NOTE — ED ADULT TRIAGE NOTE - CHIEF COMPLAINT QUOTE
chills, nausea, chest tightness started this morning, Denies chest pain, denies SOB, denies palpitations, denies pain.  Report she did not take her BP med this morning.

## 2020-03-03 NOTE — DISCHARGE NOTE ADULT - NS AS DC AMI YN
----- Message from Jayde Poole [phone]  Additional Information:     
Spoke with pt about test results. She verbalized understanding.   
no

## 2021-03-17 NOTE — ED PROVIDER NOTE - PHYSICAL EXAMINATION
Detail Level: Detailed Depth Of Biopsy: dermis Was A Bandage Applied: Yes Size Of Lesion In Cm: 0 Biopsy Type: H and E Gen: AAOx3, non-toxic  Head: NCAT  HEENT: EOMI, oral mucosa moist, normal conjunctiva  Lung: CTAB, no respiratory distress, no wheezes/rhonchi/rales B/L, speaking in full sentences  CV: RRR, no murmurs, rubs or gallops  Abd: soft, NTND, no guarding, no CVA tenderness  MSK: no visible deformities  Neuro: No focal sensory or motor deficits  Skin: Warm, well perfused, no rash  Psych: normal affect.   ~Jeancarlos Terri PGY2 Biopsy Method: Dermablade Anesthesia Type: 1% lidocaine with epinephrine Anesthesia Volume In Cc: 0.5 Hemostasis: Drysol Wound Care: Petrolatum Dressing: bandage Destruction After The Procedure: No Type Of Destruction Used: Curettage Curettage Text: The wound bed was treated with curettage after the biopsy was performed. Cryotherapy Text: The wound bed was treated with cryotherapy after the biopsy was performed. Electrodesiccation Text: The wound bed was treated with electrodesiccation after the biopsy was performed. Electrodesiccation And Curettage Text: The wound bed was treated with electrodesiccation and curettage after the biopsy was performed. Silver Nitrate Text: The wound bed was treated with silver nitrate after the biopsy was performed. Lab: [id number] Lab Facility: [id number] Consent: Written consent was obtained and risks were reviewed including but not limited to scarring, infection, bleeding, scabbing, incomplete removal, nerve damage and allergy to anesthesia. Post-Care Instructions: I reviewed with the patient in detail post-care instructions. Patient is to keep the biopsy site dry overnight, and then apply bacitracin twice daily until healed. Patient may apply hydrogen peroxide soaks to remove any crusting. Notification Instructions: Patient will be notified of biopsy results. However, patient instructed to call the office if not contacted within 2 weeks. Billing Type: Third-Party Bill Information: Selecting Yes will display possible errors in your note based on the variables you have selected. This validation is only offered as a suggestion for you. PLEASE NOTE THAT THE VALIDATION TEXT WILL BE REMOVED WHEN YOU FINALIZE YOUR NOTE. IF YOU WANT TO FAX A PRELIMINARY NOTE YOU WILL NEED TO TOGGLE THIS TO 'NO' IF YOU DO NOT WANT IT IN YOUR FAXED NOTE.

## 2022-09-15 ENCOUNTER — EMERGENCY (EMERGENCY)
Facility: HOSPITAL | Age: 72
LOS: 1 days | Discharge: HOME | End: 2022-09-15
Attending: EMERGENCY MEDICINE
Payer: MEDICARE

## 2022-09-15 VITALS
HEART RATE: 97 BPM | SYSTOLIC BLOOD PRESSURE: 184 MMHG | OXYGEN SATURATION: 100 % | WEIGHT: 171.96 LBS | HEIGHT: 61 IN | DIASTOLIC BLOOD PRESSURE: 79 MMHG | RESPIRATION RATE: 16 BRPM | TEMPERATURE: 97 F

## 2022-09-15 PROCEDURE — 70450 CT HEAD/BRAIN W/O DYE: CPT | Mod: ME

## 2022-09-15 PROCEDURE — 90471 IMMUNIZATION ADMIN: CPT

## 2022-09-15 PROCEDURE — 99284 EMERGENCY DEPT VISIT MOD MDM: CPT | Mod: 25

## 2022-09-15 PROCEDURE — 99284 EMERGENCY DEPT VISIT MOD MDM: CPT | Mod: FS

## 2022-09-15 PROCEDURE — 90715 TDAP VACCINE 7 YRS/> IM: CPT

## 2022-09-15 PROCEDURE — G1004: CPT

## 2022-09-15 RX ORDER — TETANUS TOXOID, REDUCED DIPHTHERIA TOXOID AND ACELLULAR PERTUSSIS VACCINE, ADSORBED 5; 2.5; 8; 8; 2.5 [IU]/.5ML; [IU]/.5ML; UG/.5ML; UG/.5ML; UG/.5ML
0.5 SUSPENSION INTRAMUSCULAR ONCE
Refills: 0 | Status: COMPLETED | OUTPATIENT
Start: 2022-09-15 | End: 2022-09-15

## 2022-09-15 RX ADMIN — TETANUS TOXOID, REDUCED DIPHTHERIA TOXOID AND ACELLULAR PERTUSSIS VACCINE, ADSORBED 0.5 MILLILITER(S): 5; 2.5; 8; 8; 2.5 SUSPENSION INTRAMUSCULAR at 23:38

## 2022-09-15 NOTE — ED ADULT NURSE NOTE - OBJECTIVE STATEMENT
Pt is a 72y F PMH PNA, depression, OP, HTN, HLD p/w R frontal laceration and ecchymosis s/p mechanical trip and fall. A&Ox4, PAINTER, lungs clear, distal pulses intact, abdomen soft, skin intact. Side rails up for safety, call bell and personal items within reach, instructed to call for assistance, verbalizes understanding. Will continue to monitor. Pt is a 72y F PMH PNA, depression, OP, HTN, HLD p/w R frontal laceration and ecchymosis s/p mechanical trip and fall. Pt was taking out garbage and tripped on unlevel ground, fell, and struck R forehead on concrete. Denies LOC, AC use. A&Ox4, neuro exam intact, PAINTER, lungs clear, distal pulses intact, abdomen soft, skin intact. Side rails up for safety, call bell and personal items within reach, instructed to call for assistance, verbalizes understanding. Will continue to monitor.

## 2022-09-15 NOTE — ED PROVIDER NOTE - PATIENT PORTAL LINK FT
You can access the FollowMyHealth Patient Portal offered by [facility] by registering at the following website: [email or web address]. By joining [employer]’s FollowMyHealth portal, you will also be able to view your health information using other applications (apps) compatible with our system.

## 2022-09-15 NOTE — ED PROVIDER NOTE - PHYSICAL EXAMINATION
Attn - alert, nad, head - NC, hematoma R forehead with superficial laceration/abrasion 1cm, no facial tenderness, mandible and TMJ are NT, tongue - no trauma, PERRL 3 mm, nose - NT, no deformity or signs of epistaxis, neck/spine/back - NT, Chest/ribs - NT, Lungs - clear, good BS bilaterally without splinting, Cor - RR, no M, Abdo - soft, NT, ND, no HSM or tenderness, no ecchymosis or signs of trauma, no CVAT, Pelvis/hips - NT, and no pain with AROM.  UExt - FROM without pain, NT, LExt - FROM without pain, NT,  Neuro - CN, motor, sensory, cerebellar, speech intact and non focal

## 2022-09-15 NOTE — ED PROVIDER NOTE - CLINICAL SUMMARY MEDICAL DECISION MAKING FREE TEXT BOX
Attn - trip and fall with head injury and hematoma R forehead with superficial abrasion/laceration that does not require closure.  tdap.  declined analgesia.

## 2022-09-15 NOTE — ED PROVIDER NOTE - NSICDXPASTMEDICALHX_GEN_ALL_CORE_FT
PAST MEDICAL HISTORY:  Depression     Hyperlipidemia     Hypertension     Osteopenia     Pneumonia

## 2022-09-15 NOTE — ED PROVIDER NOTE - NSFOLLOWUPINSTRUCTIONS_ED_ALL_ED_FT
Please follow up with your primary care doctor within 1 week.  *Bring all printed lab/test results to your appointment(s).*    Take acetaminophen 500-1000mg every 6 hrs as needed for pain. DO NOT EXCEED 4000mg DAILY.    Apply topical antibiotic ointment to scalp abrasion 2-3x daily    Return to the ED for worsening pain, nausea, vomiting, dizziness, or any other concerns. Please follow up with your primary care doctor within 1 week.  *Bring all printed lab/test results to your appointment(s).*    Take acetaminophen 500-1000mg every 6 hrs as needed for pain. DO NOT EXCEED 4000mg DAILY.    Apply topical antibiotic ointment to scalp abrasion 2-3x daily    Apply ice to area 20 minutes on area every 2-4 hours for the next 48-72 hours.    Return to the ED for worsening pain, nausea, vomiting, dizziness, or any other concerns. You were seen here after a head injury. The CT scan does not show any signs of internal injury at this time. Please follow up with your primary care doctor within 1 week.*Bring all printed lab/test results to your appointment(s).*    Take acetaminophen 500-1000mg every 6 hrs as needed for pain. DO NOT EXCEED 4000mg DAILY.    Apply topical antibiotic ointment to scalp abrasion 2-3x daily    Apply ice to area 20 minutes on area every 2-4 hours for the next 48-72 hours.    Return to the ED for worsening pain, nausea, vomiting, dizziness, or any other concerns.

## 2022-09-15 NOTE — ED PROVIDER NOTE - NS ED ATTENDING STATEMENT MOD
This was a shared visit with the PAPITO. I reviewed and verified the documentation and independently performed the documented:

## 2022-09-15 NOTE — ED PROVIDER NOTE - CARE PLAN
Principal Discharge DX:	Head injury  Secondary Diagnosis:	Traumatic hematoma of forehead  Secondary Diagnosis:	Laceration of forehead   1

## 2022-09-15 NOTE — ED PROVIDER NOTE - PROGRESS NOTE DETAILS
Raya Barbosa, Attending Physician: Patient signed out to me pending CTH. Results reviewed with patient. anticipatory guidance and supportive care reviewed. Return precautions including but not limited to those listed on discharge instructions were discussed at length and patient felt comfortable going home. All questions answered prior to discharge.

## 2022-09-15 NOTE — ED PROVIDER NOTE - OBJECTIVE STATEMENT
Attending note.  Patient was seen in room #34 left.  Patient tripped when emptying the garbage this evening causing her to fall and striking her head on concrete pavement.  Patient sustained wound to right forehead which began to swell.  Patient reports also mild headache with some nausea.  She had no loss of consciousness, vomiting.  She reports some mild neck stiffness in the shoulders bilaterally.  She also sustained some bruising and abrasion to the palms of both hands.  Patient recently had right hip replacement 3 months ago.  Reports some stiffness but no groin or hip pain.  She denies any chest/rib pain or abdominal pain.  She has no numbness or paresthesia.  She takes no anticoagulation or antiplatelets.

## 2022-09-15 NOTE — ED PROVIDER NOTE - NSICDXPASTSURGICALHX_GEN_ALL_CORE_FT
PAST SURGICAL HISTORY:  S/P Arthroscopic Knee Surgery     S/P  Section     S/P knee replacement partial on left

## 2022-09-16 VITALS
SYSTOLIC BLOOD PRESSURE: 162 MMHG | OXYGEN SATURATION: 99 % | RESPIRATION RATE: 17 BRPM | DIASTOLIC BLOOD PRESSURE: 81 MMHG | HEART RATE: 82 BPM

## 2022-09-16 PROCEDURE — 70450 CT HEAD/BRAIN W/O DYE: CPT | Mod: 26,ME

## 2022-09-16 PROCEDURE — G1004: CPT

## 2022-09-16 NOTE — ED ADULT NURSE REASSESSMENT NOTE - NS ED NURSE REASSESS COMMENT FT1
Report received from CHARLETTE Lira. Pt resting comfortably in stretcher, pending imaging results. Safety and comfort measures maintained.

## 2022-10-31 NOTE — H&P ADULT - PROBLEM SELECTOR PROBLEM 5
[FreeTextEntry1] : This report was generated using voice recognition software. Please excuse obvious typographical errors and contact this office for any questions. Any preliminary copy of this note given to the patient at the time of this visit has not been proofread or edited. This note is part of a shared electronic record used by our providers and may contain information generated by others in addition to those entries made during today's visit.\par  Pain of left lower extremity Other hyperlipidemia

## 2023-02-07 NOTE — ED PROVIDER NOTE - CROS ED CONS ALL NEG
Physical Therapy    Visit Type: treatment  Co-treat with: Occupational therapist  Precautions:  Medical precautions:  fall risk; standard precautions.    PPE worn during session: droplet mask and gloves    Patient admitted for ETOH withdrawl.    Pertinent medical history significant for, but not limited to: ETOH abuse, alcoholic liver disease, chronic hyponatremia, HTN).    Patient lives alone in apartment with 8 stairs to enter. For safe return to prior living situation the patient needs to be modified independent  with mobility, community mobility and ADL.    Lines:     Basic: capped IV      Lines in chart and on patient reviewed, precautions maintained throughout session.  Safety Measures: bed alarm  SUBJECTIVE  Patient agreed to participate in therapy this date.  Patient reporting that he is going home today. Patient also requesting to trial ambulation without use of 2ww. Patient reporting pain in his right leg due to his arthritis after session. Patient did not rate pain or report dizziness during session.   Patient / Family Goal: return home     OBJECTIVE     Cognitive Status   Level of Consciousness   - alert    Patient Activity Tolerance: 2 to 1 activity to rest      Range of Motion (ROM)   (degrees unless noted; active unless noted; norms in ( ); negative=lacking to 0, positive=beyond 0)  WFL: LLE, RLE  Comments: As noted during completion of functional mobility.    Strength  (out of 5 unless noted, standard test position unless noted)   Comments / Details: MMT not formally assessed during session. Patient demonstrates mild bilateral LE weakness as noted during completion of functional mobility.       Sitting Balance  (YVETTE = base of support)  Static      - Trial 1 details: modified independent, with double UE support and with double LE support  Dynamic      - Trial 1 details: modified independent, with double UE support and with double LE support    Standing Balance  (YVETTE = base of support)  Firm Surface:  Double Leg      - Static, Eyes Open       - Trial 1 details: stand by assist     - Dynamic, Eyes Open       - Trial 1 details: stand by assist       Bed Mobility  - Supine to sit: modified independent  - Sit to supine: modified independent  Transfers  Assistive devices: gait belt  - Sit to stand: stand by assist, with verbal cues  - Stand to sit: stand by assist, with verbal cues  Verbal cues for sequencing    Ambulation / Gait  - Assistive device: gait belt  - Distance (feet unless otherwise indicated): 75  - Assist Level: stand by assist and with verbal cues  - Description: decreased fred/pace, decreased step length right, decreased step length left and shuffling  Verbal cues for pacing and energy conservation.     Stair Ambulation  Second Trial  Deferred  Secondary to patient requesting to return to room and reporting he has no concerns with completion of steps at home.        Interventions     Training provided: activity tolerance, safety training, transfer training, functional ambulation, balance retraining, body mechanics, energy conservation, compensatory techniques and bed mobility training    Skilled input: Verbal instruction/cues and tactile instruction/cues  Verbal Consent: Writer verbally educated and received verbal consent for hand placement, positioning of patient, and techniques to be performed today from patient for clothing adjustments for techniques, hand placement and palpation for techniques and therapist position for techniques as described above and how they are pertinent to the patient's plan of care.         ASSESSMENT   Impairments: strength, balance deficits, pain, endurance and activity tolerance  Functional Limitations: all functional mobility    Patient seen on 3S nursing unit. Today's treatment focused on bed mobility, transfers and ambulation.  The patient is demonstrating fair progress as evidenced by increased ambulation distance and tolerance during session. Patient limited due  to pain in right LE.      Patient is currently functioning at modified independent  for bed mobility, stand by assist for transfers and stand by assist for ambulation. Patient currently lacks assistance at their previous living situation.               Discharge Recommendations  Recommendation for Discharge Location: PT WI: Post-acute facility with therapy needs, Pending functional progress  Recommendation for Discharge Support: PT WI: Intermittent assist daily  PT/OT Mobility Equipment for Discharge: likely needs 2ww, however, patient reports he has a cane and 2ww at home  PT/OT ADL Equipment for Discharge: continue to assess  PT Identified Barriers to Discharge: Weakness        Progress: progressing toward goals    • Skilled therapy is required to address these limitations in attempt to maximize the patient's independence.    Education:   - Present and ready to learn: patient  Education provided during session:  - Results of above outlined education: Verbalizes understanding and Needs reinforcement    Patient at End of Session:   Location: in bed  Safety measures: call light within reach, lines intact, equipment intact and alarm system in place/re-engaged  Handoff to: nurse    PLAN   Suggestions for next session as indicated: Plan: progress ambulation, trial steps    PT Frequency: 3-5 x per week      Interventions: strengthening, ROM, gait training, energy conservation, body mechanics, balance, bed mobility, continued evaluation, equipment eval/education, HEP train/position, patient/family training, safety education, stairs retraining, functional transfer training, endurance training and compensatory technique education  Agreement to plan and goals: patient agrees with goals and treatment plan        GOALS  Review Date: 2/8/2023  Long Term Goals: (to be met by time of discharge from hospital)  Sit to supine: Patient will complete sit to supine modified independent.  Status: met   Supine to sit: Patient will  complete supine to sit modified independent.  Status: met   Sit to stand: Patient will complete sit to stand transfer with least restrictive device, modified independent.   Status: progressing/ongoing  Stand to sit: Patient will complete stand to sit transfer with least restrictive device, modified independent.   Status: progressing/ongoing  Ambulation (even): Patient will ambulate on even surface for 150 feet with least restrictive device, modified independent.   Status: progressing/ongoing  Stairs: Patient will ambulate 8 steps with least restrictive device using one rail, modified independent.     Documented in the chart in the following areas: Assessment.      Therapy procedure time and total treatment time can be found documented on the Time Entry flowsheet   negative...

## 2025-06-18 NOTE — ED PROVIDER NOTE - PATIENT PORTAL LINK FT
Detail Level: Detailed
Quality 226: Preventive Care And Screening: Tobacco Use: Screening And Cessation Intervention: Patient screened for tobacco use and is an ex/non-smoker
You can access the FollowMyHealth Patient Portal offered by [facility] by registering at the following website: [email or web address]. By joining [employer]’s FollowMyHealth portal, you will also be able to view your health information using other applications (apps) compatible with our system.

## 2025-08-12 ENCOUNTER — APPOINTMENT (OUTPATIENT)
Dept: INTERNAL MEDICINE | Facility: CLINIC | Age: 75
End: 2025-08-12